# Patient Record
Sex: FEMALE | Employment: OTHER | URBAN - METROPOLITAN AREA
[De-identification: names, ages, dates, MRNs, and addresses within clinical notes are randomized per-mention and may not be internally consistent; named-entity substitution may affect disease eponyms.]

---

## 2024-04-10 ENCOUNTER — TRANSFERRED RECORDS (OUTPATIENT)
Dept: HEALTH INFORMATION MANAGEMENT | Facility: CLINIC | Age: 74
End: 2024-04-10
Payer: COMMERCIAL

## 2024-04-16 ENCOUNTER — OFFICE VISIT (OUTPATIENT)
Dept: OBGYN | Facility: CLINIC | Age: 74
End: 2024-04-16
Payer: COMMERCIAL

## 2024-04-16 VITALS
SYSTOLIC BLOOD PRESSURE: 119 MMHG | RESPIRATION RATE: 16 BRPM | HEIGHT: 60 IN | WEIGHT: 114.6 LBS | HEART RATE: 73 BPM | DIASTOLIC BLOOD PRESSURE: 71 MMHG | BODY MASS INDEX: 22.5 KG/M2 | TEMPERATURE: 97.6 F

## 2024-04-16 DIAGNOSIS — N83.291 COMPLEX CYST OF RIGHT OVARY: Primary | ICD-10-CM

## 2024-04-16 DIAGNOSIS — R27.0 ATAXIA, UNSPECIFIED: ICD-10-CM

## 2024-04-16 LAB
Lab: NORMAL
PERFORMING LABORATORY: NORMAL
TEST NAME: NORMAL

## 2024-04-16 PROCEDURE — 86304 IMMUNOASSAY TUMOR CA 125: CPT | Performed by: OBSTETRICS & GYNECOLOGY

## 2024-04-16 PROCEDURE — 99203 OFFICE O/P NEW LOW 30 MIN: CPT | Performed by: OBSTETRICS & GYNECOLOGY

## 2024-04-16 PROCEDURE — 36415 COLL VENOUS BLD VENIPUNCTURE: CPT | Performed by: OBSTETRICS & GYNECOLOGY

## 2024-04-16 RX ORDER — CARVEDILOL 12.5 MG/1
12.5 TABLET ORAL 2 TIMES DAILY WITH MEALS
COMMUNITY
Start: 2023-06-08

## 2024-04-16 RX ORDER — OLANZAPINE 10 MG/1
10 TABLET ORAL AT BEDTIME
COMMUNITY
Start: 2023-12-06

## 2024-04-16 RX ORDER — OLANZAPINE 5 MG/1
2.5 TABLET ORAL AT BEDTIME
COMMUNITY
Start: 2023-12-06

## 2024-04-16 RX ORDER — ATORVASTATIN CALCIUM 10 MG/1
10 TABLET, FILM COATED ORAL DAILY
COMMUNITY
Start: 2023-06-08

## 2024-04-16 RX ORDER — CLONAZEPAM 0.5 MG/1
1 TABLET ORAL AT BEDTIME
COMMUNITY
Start: 2024-02-27

## 2024-04-16 RX ORDER — CHLORTHALIDONE 25 MG/1
0.5 TABLET ORAL EVERY MORNING
COMMUNITY
Start: 2023-06-08

## 2024-04-16 RX ORDER — AMLODIPINE AND BENAZEPRIL HYDROCHLORIDE 10; 20 MG/1; MG/1
1 CAPSULE ORAL DAILY
COMMUNITY
Start: 2023-06-08

## 2024-04-16 RX ORDER — ALENDRONATE SODIUM 70 MG/1
70 TABLET ORAL
COMMUNITY
Start: 2023-08-17

## 2024-04-16 RX ORDER — POTASSIUM CHLORIDE 750 MG/1
10 CAPSULE, EXTENDED RELEASE ORAL DAILY
COMMUNITY
Start: 2023-06-08

## 2024-04-16 NOTE — PROGRESS NOTES
Chief Complaint   Patient presents with    Consult     Ovarian cyst.  Imaging done 4/10/24         HPI:  Tessie Graham, 74 year old,  presents with complaints of a cyst found on her ovary at CT scan and then ultrasound.  She had a colonoscopy followed by a CT scan where they found the cyst on her ovary.  She has had some pain since 2018 on her right side.  She had surgery for a bowel obstruction and her pain has persisted since then.  She has bloating and constipation, but no change in bowel habits.  No bleeding, change in appetite or weight.      Past Medical History:   Diagnosis Date    Acute bronchitis     Acute respiratory failure, unspecified whether with hypoxia or hypercapnia (H)     Anxiety     Bipolar disorder (H)     Delayed emergence from anesthesia     Gastric outlet obstruction     HTN (hypertension)     Hypopotassemia     Insomnia     Liver lesion     Lyme disease     Mixed hyperlipidemia     Peptic ulcer     Pulmonary embolism (H)     Tension headache     Toe fracture, right      Past Surgical History:   Procedure Laterality Date    APPENDECTOMY      BOWEL RESECTION      partial small bwoel    CHOLECYSTECTOMY      EGD      robotic hernia repair with mesh      umbilical    TUBAL LIGATION       Social History     Socioeconomic History    Marital status:      Spouse name: Not on file    Number of children: Not on file    Years of education: Not on file    Highest education level: Not on file   Occupational History    Not on file   Tobacco Use    Smoking status: Never    Smokeless tobacco: Never   Substance and Sexual Activity    Alcohol use: Not on file    Drug use: Not on file    Sexual activity: Not on file   Other Topics Concern    Not on file   Social History Narrative    Not on file     Social Determinants of Health     Financial Resource Strain: High Risk (2022)    Received from [employer] [facility]    Financial Resource Strain     Difficulty of Paying  Living Expenses: Not on file     Difficulty of Paying Living Expenses: Not on file   Food Insecurity: Not on file   Transportation Needs: Not on file   Physical Activity: Not on file   Stress: Not on file   Social Connections: Unknown (1/1/2022)    Received from [facility] & [facility]    Social Connections     Frequency of Communication with Friends and Family: Not on file   Interpersonal Safety: Not on file   Housing Stability: Not on file     History reviewed. No pertinent family history.     Current Outpatient Medications   Medication Sig Dispense Refill    alendronate (FOSAMAX) 70 MG tablet Take 70 mg by mouth every 7 days      amLODIPine-benazepril (LOTREL) 10-20 MG capsule Take 1 capsule by mouth daily      Aspirin 81 MG CAPS Take 81 mg by mouth daily      atorvastatin (LIPITOR) 10 MG tablet Take 10 mg by mouth daily      carvedilol (COREG) 12.5 MG tablet Take 12.5 mg by mouth 2 times daily (with meals)      chlorthalidone (HYGROTON) 25 MG tablet Take 0.5 tablets by mouth every morning      clonazePAM (KLONOPIN) 0.5 MG tablet Take 1 tablet by mouth at bedtime      OLANZapine (ZYPREXA) 10 MG tablet Take 10 mg by mouth at bedtime 12.5mg      OLANZapine (ZYPREXA) 5 MG tablet Take 2.5 mg by mouth at bedtime Take with 10 mg      omeprazole (PRILOSEC) 20 MG DR capsule Take 20 mg by mouth daily      potassium chloride ER (MICRO-K) 10 MEQ CR capsule Take 10 mEq by mouth daily          Allergies:     Allergies   Allergen Reactions    Azithromycin Other (See Comments) and Shortness Of Breath    Hydrochlorothiazide Itching    Trazodone Other (See Comments)    Triamterene Itching        ROS:  See HPI      Physical Exam:  /71 (BP Location: Right arm, Patient Position: Sitting, Cuff Size: Adult Regular)   Pulse 73   Temp 97.6  F (36.4  C)   Resp 16   Ht 1.524 m (5')   Wt 52 kg (114 lb 9.6 oz)   BMI 22.38 kg/m       Appearance: well developed, well nourished, no acute distress  Head Exam  normocephalic, no lesions or deformities  Abdomen: soft, Mild RLQ tenderness, no masses, no organomegaly, no hernia, no rebound or guarding.  Vertical umbilical scar consistent with surgery.  Skin: no lesions  Extremities: no edema  Psych: orientated x3      Impression    1.  Normal sonographic appearance of the uterus.  2.  The right ovary contains 2 mildly complex cysts one within the apparent rim calcified wall, measuring  2.8 x 3.2 x 3.9 cm and the other with uniform low-level internal echoes, measuring 1.3 x 1.5 x 2.1 cm. Per the attached reference consider surgical consultation.      COMPLEX INDETERMINATE CYSTS:  Postmenopausal:  Any complex cyst: Surgical consultation.    REFERENCE:  Management of Asymptomatic Ovarian and Other Adnexal Cysts Imaged at US: Society of Radiologists in Ultrasound Consensus Conference Statement. Radiology September 2010; [id number].    Simple Adnexal Cysts: U Consensus Conference Update on Follow-up and Reporting. Radiology September 2019. [id number].  Narrative    For Patients: As a result of the 21st Century Cures Act, medical imaging exams and procedure reports are released immediately into your electronic medical record. You may view this report before your referring provider. If you have questions, please contact your health care provider.    EXAM: US PELVIS COMPLETE TA AND TV WITH DUPLEX  LOCATION: [facility]  DATE: 4/10/2024    INDICATION: Adnexal mass.  COMPARISON: None.  TECHNIQUE: Transabdominal scans were performed. Endovaginal ultrasound was performed to better visualize the adnexa. Color flow with spectral Doppler and waveform analysis performed.    FINDINGS:    UTERUS: 6 x 2 x 3.3 cm. Normal in size and position with no masses.    ENDOMETRIUM: 2 mm. Normal smooth endometrium.    RIGHT OVARY: 4.9 x 3.3 x 4.2 cm. There is a cyst with an echogenic rim suggesting a possible calcified rim measuring 2.8 x 3.2 x 3.9 cm  . There is a rounded  hypoechoic area with low-level internal echoes measuring 1.3 x 1.5 x 2.1 cm. Arterial and venous duplex flow identified within the right ovary.    LEFT OVARY: Obscured by bowel gas and not visualized. Normal with arterial and venous duplex flow identified.    No significant free fluid.     Assessment/Plan:  Encounter Diagnoses   Name Primary?    Complex cyst of right ovary Yes           Discussed cysts on the ovaries.  Discussed options including observation or surgical evaluation. Discussed blood tests and DAT score ordered today. Due to the complex nature of the cyst, age and surgical risk factors, I did recommend consult with gyn-oncology.      She has a history of a partial small bowel resection, umbilical hernia with mesh as well as hypotension with anesthesia and a history of PE.      30 minutes spent on 4/16/2024 doing chart review, review of outside records, review of test results, patient visit, and documentation.      Thao Lopes MD on 4/16/2024 at 10:24 AM

## 2024-04-18 LAB — MISCELLANEOUS TEST 1 (ARUP): ABNORMAL

## 2024-05-03 ENCOUNTER — TRANSFERRED RECORDS (OUTPATIENT)
Dept: HEALTH INFORMATION MANAGEMENT | Facility: CLINIC | Age: 74
End: 2024-05-03
Payer: COMMERCIAL

## 2024-05-07 NOTE — CONSULTS
GYNECOLOGIC ONCOLOGY CONSULT    Patient Name: SAM GRAHAM Patient : 1950  Patient MRN: [id number]  Referring Physician: Thao Vazquez MD  Primary GYNOncologist: Brittni Jackson (Gynecological/Oncology)  Date of Service: 2024    Reason for Consult:  Treatment recommendations    History of Present Illness (Gyn Oncology):  Sam Graham is a 74-year-old female who was recently identified to have 2 mildly complex right ovarian cysts one within the apparent rim calcified wall, measuring 2.8 x 3.2 x 3.9 cm and the other with uniform low-level internal echoes, measuring 1.3 x 1.5 x 2.1 cm per pelvic ultrasound 4/10/24. She presents today for treatment recommendations.     Clinical Course:  18-/18: Sam presented with stabby, right lower quadrant abdominal pain for 2 days with associated nausea, rated at a 10/10. She has had an episode of emesis. She has chronic constipation. She was also feeling very hot.  18: CT AP showed high-grade small bowel obstruction with apparent transition point in the right hemiabdomen. There is associated perihepatic and central mesenteric ascites as well as inflammatory stranding in the central mesentery. Surgical consultation warranted. No evidence of pneumatosis and no evidence of intra-abdominal abscess or visceral perforation. Diverticulosis. Multicystic adnexal lesion, which warrants further evaluation with pelvic ultrasound.  She underwent XL, small bowel resection. Pathology was negative for perforation or obvious bacterial proliferation.  18: CT CAP showed bilateral pleural effusions with atelectatic lung consolidations. Groundglass lung opacities, suspect pulmonary edema, fluid overload. Postoperative fluid in the abdomen with a small amount of right flank subcutaneous fluid. Endotracheal tube and NG tube in good position. There has been relief of bowel obstruction with no current evidence of viscus rupture, postoperative free fluid.  18: CT  AP showed recurrent bowel obstruction with transition point just distal to the resection line of the small bowel. Bibasilar lung atelectasis, consolidations and fluid. Pelvic cyst with free fluid in the abdomen.  9/12/18: CT CAP showed no evidence of bowel obstruction. Basilar lung pleural effusions and consolidative atelectasis persists. Abdominal free fluid stable. No evidence of bowel wall edema or hypervascularity. Vaginal and bladder gas again noted. Right adnexal cystic changes stable. NG tube in good position with decompressed bowel.  9/14/18: Bone marrow biopsy moderate, hypochromic, normocytic anemia. Moderate thrombocytosis.  9/17/18: EGD was normal. Demonstrated no source of chronic or acute GI blood loss.  2/28/24: She presented with right lower quadrant abdominal pain. She has been getting on and off right lower quadrant abdominal pain. She does have a history of a small bowel obstruction and had a partial resection 9/4/18. She has had pain on and off since that time. She is due for a colonoscopy and has 1 scheduled for 3/13/24. She recently had an exacerbation from Sunday to Friday a week ago but it spontaneously resolved on its own. She is not currently having pain. She had pain in her right lower quadrant that radiated around to her back.  3/29/24: CT AP showed no signs of bowel obstruction or significant bowel inflammation. Possible retained pill endoscopy device within the proximal ascending colon. Please correlate with history. A single 3 mm stone in the left kidney. No other urolithiasis. No ureteral stones or ureteral dilatation. Slight prominence of the right renal pelvis is nonspecific but could be related to chronic mild congenital ureteropelvic junction obstruction. Complex cystic mass in the right adnexa. Recommend further evaluation with pelvic ultrasound.  4/10/24: Pelvic ultrasound showed normal sonographic appearance of the uterus. The right ovary contains 2 mildly complex cysts one  within the apparent rim calcified wall, measuring 2.8 x 3.2 x 3.9 cm and the other with uniform low-level internal echoes, measuring 1.3 x 1.5 x 2.1 cm.  4/16/24:  = 19.  Genetic Testing (Gyn Oncology):  N/A    Interval History (Gyn Oncology):  Tessie presents to the clinic today for further consultation and treatment recommendations. She reports she is doing okay today. The patient complains of intermittent right lower quadrant abdominal pain, lower back pain, and persistent constipation. She has been eating and drinking okay. She denies any abnormal vaginal bleeding or pelvic pain. She denies dysuria, hematuria, melena, or bright red blood per rectum. She has no abnormal lower extremity edema. She denies peripheral neuropathy. She denies any nausea, vomiting, fever, shortness of breath, chest pain, cough, night sweats, and chills.    Review of Systems:  A complete 14-point review of systems is negative except as noted in the above history of present illness.    Past Medical History:  Right lower quadrant abdominal pain  Nausea  Chronic constipation  Bipolar 1 disorder  Disorders of liver  Pulmonary embolism and infarction  Chest pain  GERD  Hypopotassemia  Hypertension  Anxiety  Blood in stool  Displacement of intervertebral disc  Pure hypercholesterolemia  Tension headache   Gastric outlet obstruction  Hyperlipidemia  Insomnia  Liver lesion  Lyme disease  Peptic ulcer  Right 5th toe fracture  Recurrent small bowel obstruction  Diverticulosis  Right adnexal lesion/cyst  Emesis  Bilateral pleural effusions  Pelvic cyst  Normocytic anemia  Thrombocytosis  Pneumonia  Hypotension  Osteoporosis  Right ear decreased hearing due to cerumen impaction  Left kidney stone  Bronchitis    Surgical History:  Cholecystectomy 2003  Appendectomy 2000  Colonoscopy [facility] EGD 2010  Tubal ligation 1986  XL, small bowel resection 9/4/18  EGD 9/17/18  Colonoscopy with polypectomy 3/13/24  Robotic umbilical hernia repair with  mesh    OB/Gyn History:  Menarche age: 14  . , x2.   LMP: 57  Last mammogram:   Last Pap smear: Unknown   Last colonoscopy 3/13/24, normal  Denies history of HRT    Allergies#  azithromycin, hydrochlorothiazide, trazodone and triamterene    Medications:  Fosamax (Alendronate Oral (Weekly)) 70 mg tablet  Lipitor (Atorvastatin Oral) 10 mg tablet  Klonopin (Clonazepam Oral) 0.5 mg tablet  Prilosec (Omeprazole Oral Delayed Release Tablet) 20 mg tablet,delayed release (DR/EC)  Zyprexa (Olanzapine Oral) 10 mg tablet  Amlodipine Oral 10 mg tablet  Potassium Chloride Oral ER Cap 10 mEq capsule, extended release  Aspirin Oral 81 mg tablet,chewable  Chlorthalidone Oral 25 mg tablet  Coreg (Carvedilol Oral) 12.5 mg tablet  Family History:  Mother - Cervical cancer  Maternal grandfather: Hodgkin's lymphoma.    Maternal grandmother: Cancer, unknown type.     Social History:    Not sexually active  Lives in a house  Never smoker  No alcohol consumption  No illicit drug use    Health Maintenance:    Vital Signs:  Blood pressure: 134/70, Pulse: 62, Temperature: 97.2 F, Respirations: 16, O2 sat: 96%, Pain Scale: 0, Height: 60 in, Weight: 114.2 lb, BSA: 1.47, BMI: 22.3 kg/m2    Physical Exam (Gyn Oncology):  General: Alert and oriented x3, no acute distress. Thin and elderly  HEENT: Normocephalic, atraumatic.  Lymph node exam: No supraclavicular, submandibular, or cervical lymphadenopathy.  CV: Regular rate and rhythm, no murmurs, rubs or gallops.  Respiratory: Clear to auscultation bilaterally, no wheezes, rales, or rhonchi.  Abdomen: Soft, non-tender to palpation, no rebound or guarding.  Lower Extremity Exam: No lower extremity edema, no palpable cords.  Neuro: Cranial nerves II-XII intact, gross motor skills intact.  Genitourinary exam: Deferred.    Laboratory Data:    Imagin18: CT AP  IMPRESSION:  High-grade small bowel obstruction with apparent transition point in the right hemiabdomen. There is  associated perihepatic and central mesenteric ascites as well as inflammatory stranding in the central mesentery. Surgical consultation warranted. No evidence of pneumatosis and no evidence of intra-abdominal abscess or visceral perforation. Diverticulosis. Multicystic adnexal lesion, which warrants further evaluation with pelvic ultrasound.    9/5/18: CT CAP  IMPRESSION:  Bilateral pleural effusions with atelectatic lung consolidations. Groundglass lung opacities, suspect pulmonary edema, fluid overload. Postoperative fluid in the abdomen with a small amount of right flank subcutaneous fluid. Endotracheal tube and NG tube in good position. There has been relief of bowel obstruction with no current evidence of viscus rupture, postoperative free fluid.    9/9/18: CT AP  IMPRESSION:  Recurrent bowel obstruction with transition point just distal to the resection line of the small bowel. Bibasilar lung atelectasis, consolidations and fluid. Pelvic cyst with free fluid in the abdomen.    9/12/18: CT CAP  IMPRESSION:  No evidence of bowel obstruction. Basilar lung pleural effusions and consolidative atelectasis persists. Abdominal free fluid stable. No evidence of bowel wall edema or hypervascularity. Vaginal and bladder gas again noted. Right adnexal cystic changes stable. NG tube in good position with decompressed bowel.    3/29/24: CT AP  IMPRESSION:  No signs of bowel obstruction or significant bowel inflammation. Possible retained pill endoscopy device within the proximal ascending colon. Please correlate with history. A single 3 mm stone in the left kidney. No other urolithiasis. No ureteral stones or ureteral dilatation. Slight prominence of the right renal pelvis is nonspecific but could be related to chronic mild congenital ureteropelvic junction obstruction. Complex cystic mass in the right adnexa. Recommend further evaluation with pelvic ultrasound.    4/10/24: US Pelvis  IMPRESSION:  Normal sonographic  appearance of the uterus. The right ovary contains 2 mildly complex cysts one within the apparent rim calcified wall, measuring 2.8 x 3.2 x 3.9 cm and the other with uniform low-level internal echoes, measuring 1.3 x 1.5 x 2.1 cm.    Problems:       Assessment & Plan (Gyn Oncology):  Tessie Graham is a 74-year-old female who was recently identified to have 2 mildly complex right ovarian cysts one within the apparent rim calcified wall, measuring 2.8 x 3.2 x 3.9 cm and the other with uniform low-level internal echoes, measuring 1.3 x 1.5 x 2.1 cm per pelvic ultrasound 4/10/24. She presents today for treatment recommendations.   Right complex ovarian cystic lesions - Tessie, her , and I reviewed her imaging in detail together. We reviewed the mildly complex lesions appearing to arise from the right ovary and fallopian tube. We did review that a slightly larger version of these lesions was present back in 2018 when Tessie was undergoing workup for her small bowel obstruction. At that time, there was a lot more inflammation and haziness associated with bowel obstruction within the abdomen and pelvis. At this time, there is now a more clearly well-defined persistent cystic ovarian lesion of the right ovary. We reviewed options for treatment include either observation with repeat imaging in 8-12 weeks from the prior to evaluate for a change versus proceeding with surgical management. We reviewed the surgical management would include a robotic-assisted bilateral salpingo-oophorectomy with possible staging. We reviewed the staging could include removal of the uterus, the omentum, and lymph nodes along the aorta and in the pelvis. We reviewed the risks of surgery that include bleeding, infection, and potential damage to the surrounding structures. We reviewed the general perioperative expectations. We also reviewed the need for preoperative history and physical with her primary care provider. Being that Tessie has  associated low back discomfort and abdominal pain in an intermittent fashion. She is somewhat determined or certain that her symptoms are related to her right ovary. We reviewed that surgery may not completely treat her symptoms; however, it is not unreasonable to proceed with a bilateral salpingo-oophorectomy in an effort to help her feel better on a general basis. Tessie and her  desire to proceed with surgery and were in agreement with the plan. We will get her procedure scheduled as soon as reasonable.   Thank you very much for allowing me to take part in the care of this very sweet patient.    3 minutes in reviewing records, 18 minutes in discussion, 2 minutes ordering labs.    Pain Care Management:  Pain Scale: 0    Patient Care needs:  Depressions Status: Was not screened Reason: Bipolar disorder; Screening Date: 05/01/2024  Smoking Status: Smoking Tobacco : Never smoker; Smokeless Tobacco : none found; Vaping : none found  Documentation assistance provided by Sheikh Jenae Bradford, scribing for Dr. Brittni Jackson, on 5/3/2024.  The patient and family/friends if present were apprised of the use of a scribe through remote viewing and all parties consented to conducting the visit in this manner.    Brittni Jackson MD  Phone: [phone]  Fax: [phone]

## 2024-05-07 NOTE — H&P (VIEW-ONLY)
GYNECOLOGIC ONCOLOGY CONSULT    Patient Name: SAM GRAHAM Patient : 1950  Patient MRN: [id number]  Referring Physician: Thao Vazquez MD  Primary GYNOncologist: Brittni Jackson (Gynecological/Oncology)  Date of Service: 2024    Reason for Consult:  Treatment recommendations    History of Present Illness (Gyn Oncology):  Sam Graham is a 74-year-old female who was recently identified to have 2 mildly complex right ovarian cysts one within the apparent rim calcified wall, measuring 2.8 x 3.2 x 3.9 cm and the other with uniform low-level internal echoes, measuring 1.3 x 1.5 x 2.1 cm per pelvic ultrasound 4/10/24. She presents today for treatment recommendations.     Clinical Course:  18-/18: Sam presented with stabby, right lower quadrant abdominal pain for 2 days with associated nausea, rated at a 10/10. She has had an episode of emesis. She has chronic constipation. She was also feeling very hot.  18: CT AP showed high-grade small bowel obstruction with apparent transition point in the right hemiabdomen. There is associated perihepatic and central mesenteric ascites as well as inflammatory stranding in the central mesentery. Surgical consultation warranted. No evidence of pneumatosis and no evidence of intra-abdominal abscess or visceral perforation. Diverticulosis. Multicystic adnexal lesion, which warrants further evaluation with pelvic ultrasound.  She underwent XL, small bowel resection. Pathology was negative for perforation or obvious bacterial proliferation.  18: CT CAP showed bilateral pleural effusions with atelectatic lung consolidations. Groundglass lung opacities, suspect pulmonary edema, fluid overload. Postoperative fluid in the abdomen with a small amount of right flank subcutaneous fluid. Endotracheal tube and NG tube in good position. There has been relief of bowel obstruction with no current evidence of viscus rupture, postoperative free fluid.  18: CT  AP showed recurrent bowel obstruction with transition point just distal to the resection line of the small bowel. Bibasilar lung atelectasis, consolidations and fluid. Pelvic cyst with free fluid in the abdomen.  9/12/18: CT CAP showed no evidence of bowel obstruction. Basilar lung pleural effusions and consolidative atelectasis persists. Abdominal free fluid stable. No evidence of bowel wall edema or hypervascularity. Vaginal and bladder gas again noted. Right adnexal cystic changes stable. NG tube in good position with decompressed bowel.  9/14/18: Bone marrow biopsy moderate, hypochromic, normocytic anemia. Moderate thrombocytosis.  9/17/18: EGD was normal. Demonstrated no source of chronic or acute GI blood loss.  2/28/24: She presented with right lower quadrant abdominal pain. She has been getting on and off right lower quadrant abdominal pain. She does have a history of a small bowel obstruction and had a partial resection 9/4/18. She has had pain on and off since that time. She is due for a colonoscopy and has 1 scheduled for 3/13/24. She recently had an exacerbation from Sunday to Friday a week ago but it spontaneously resolved on its own. She is not currently having pain. She had pain in her right lower quadrant that radiated around to her back.  3/29/24: CT AP showed no signs of bowel obstruction or significant bowel inflammation. Possible retained pill endoscopy device within the proximal ascending colon. Please correlate with history. A single 3 mm stone in the left kidney. No other urolithiasis. No ureteral stones or ureteral dilatation. Slight prominence of the right renal pelvis is nonspecific but could be related to chronic mild congenital ureteropelvic junction obstruction. Complex cystic mass in the right adnexa. Recommend further evaluation with pelvic ultrasound.  4/10/24: Pelvic ultrasound showed normal sonographic appearance of the uterus. The right ovary contains 2 mildly complex cysts one  within the apparent rim calcified wall, measuring 2.8 x 3.2 x 3.9 cm and the other with uniform low-level internal echoes, measuring 1.3 x 1.5 x 2.1 cm.  4/16/24:  = 19.  Genetic Testing (Gyn Oncology):  N/A    Interval History (Gyn Oncology):  Tessie presents to the clinic today for further consultation and treatment recommendations. She reports she is doing okay today. The patient complains of intermittent right lower quadrant abdominal pain, lower back pain, and persistent constipation. She has been eating and drinking okay. She denies any abnormal vaginal bleeding or pelvic pain. She denies dysuria, hematuria, melena, or bright red blood per rectum. She has no abnormal lower extremity edema. She denies peripheral neuropathy. She denies any nausea, vomiting, fever, shortness of breath, chest pain, cough, night sweats, and chills.    Review of Systems:  A complete 14-point review of systems is negative except as noted in the above history of present illness.    Past Medical History:  Right lower quadrant abdominal pain  Nausea  Chronic constipation  Bipolar 1 disorder  Disorders of liver  Pulmonary embolism and infarction  Chest pain  GERD  Hypopotassemia  Hypertension  Anxiety  Blood in stool  Displacement of intervertebral disc  Pure hypercholesterolemia  Tension headache   Gastric outlet obstruction  Hyperlipidemia  Insomnia  Liver lesion  Lyme disease  Peptic ulcer  Right 5th toe fracture  Recurrent small bowel obstruction  Diverticulosis  Right adnexal lesion/cyst  Emesis  Bilateral pleural effusions  Pelvic cyst  Normocytic anemia  Thrombocytosis  Pneumonia  Hypotension  Osteoporosis  Right ear decreased hearing due to cerumen impaction  Left kidney stone  Bronchitis    Surgical History:  Cholecystectomy 2003  Appendectomy 2000  Colonoscopy [facility] EGD 2010  Tubal ligation 1986  XL, small bowel resection 9/4/18  EGD 9/17/18  Colonoscopy with polypectomy 3/13/24  Robotic umbilical hernia repair with  mesh    OB/Gyn History:  Menarche age: 14  . , x2.   LMP: 57  Last mammogram:   Last Pap smear: Unknown   Last colonoscopy 3/13/24, normal  Denies history of HRT    Allergies#  azithromycin, hydrochlorothiazide, trazodone and triamterene    Medications:  Fosamax (Alendronate Oral (Weekly)) 70 mg tablet  Lipitor (Atorvastatin Oral) 10 mg tablet  Klonopin (Clonazepam Oral) 0.5 mg tablet  Prilosec (Omeprazole Oral Delayed Release Tablet) 20 mg tablet,delayed release (DR/EC)  Zyprexa (Olanzapine Oral) 10 mg tablet  Amlodipine Oral 10 mg tablet  Potassium Chloride Oral ER Cap 10 mEq capsule, extended release  Aspirin Oral 81 mg tablet,chewable  Chlorthalidone Oral 25 mg tablet  Coreg (Carvedilol Oral) 12.5 mg tablet  Family History:  Mother - Cervical cancer  Maternal grandfather: Hodgkin's lymphoma.    Maternal grandmother: Cancer, unknown type.     Social History:    Not sexually active  Lives in a house  Never smoker  No alcohol consumption  No illicit drug use    Health Maintenance:    Vital Signs:  Blood pressure: 134/70, Pulse: 62, Temperature: 97.2 F, Respirations: 16, O2 sat: 96%, Pain Scale: 0, Height: 60 in, Weight: 114.2 lb, BSA: 1.47, BMI: 22.3 kg/m2    Physical Exam (Gyn Oncology):  General: Alert and oriented x3, no acute distress. Thin and elderly  HEENT: Normocephalic, atraumatic.  Lymph node exam: No supraclavicular, submandibular, or cervical lymphadenopathy.  CV: Regular rate and rhythm, no murmurs, rubs or gallops.  Respiratory: Clear to auscultation bilaterally, no wheezes, rales, or rhonchi.  Abdomen: Soft, non-tender to palpation, no rebound or guarding.  Lower Extremity Exam: No lower extremity edema, no palpable cords.  Neuro: Cranial nerves II-XII intact, gross motor skills intact.  Genitourinary exam: Deferred.    Laboratory Data:    Imagin18: CT AP  IMPRESSION:  High-grade small bowel obstruction with apparent transition point in the right hemiabdomen. There is  associated perihepatic and central mesenteric ascites as well as inflammatory stranding in the central mesentery. Surgical consultation warranted. No evidence of pneumatosis and no evidence of intra-abdominal abscess or visceral perforation. Diverticulosis. Multicystic adnexal lesion, which warrants further evaluation with pelvic ultrasound.    9/5/18: CT CAP  IMPRESSION:  Bilateral pleural effusions with atelectatic lung consolidations. Groundglass lung opacities, suspect pulmonary edema, fluid overload. Postoperative fluid in the abdomen with a small amount of right flank subcutaneous fluid. Endotracheal tube and NG tube in good position. There has been relief of bowel obstruction with no current evidence of viscus rupture, postoperative free fluid.    9/9/18: CT AP  IMPRESSION:  Recurrent bowel obstruction with transition point just distal to the resection line of the small bowel. Bibasilar lung atelectasis, consolidations and fluid. Pelvic cyst with free fluid in the abdomen.    9/12/18: CT CAP  IMPRESSION:  No evidence of bowel obstruction. Basilar lung pleural effusions and consolidative atelectasis persists. Abdominal free fluid stable. No evidence of bowel wall edema or hypervascularity. Vaginal and bladder gas again noted. Right adnexal cystic changes stable. NG tube in good position with decompressed bowel.    3/29/24: CT AP  IMPRESSION:  No signs of bowel obstruction or significant bowel inflammation. Possible retained pill endoscopy device within the proximal ascending colon. Please correlate with history. A single 3 mm stone in the left kidney. No other urolithiasis. No ureteral stones or ureteral dilatation. Slight prominence of the right renal pelvis is nonspecific but could be related to chronic mild congenital ureteropelvic junction obstruction. Complex cystic mass in the right adnexa. Recommend further evaluation with pelvic ultrasound.    4/10/24: US Pelvis  IMPRESSION:  Normal sonographic  appearance of the uterus. The right ovary contains 2 mildly complex cysts one within the apparent rim calcified wall, measuring 2.8 x 3.2 x 3.9 cm and the other with uniform low-level internal echoes, measuring 1.3 x 1.5 x 2.1 cm.    Problems:       Assessment & Plan (Gyn Oncology):  Tessie Graham is a 74-year-old female who was recently identified to have 2 mildly complex right ovarian cysts one within the apparent rim calcified wall, measuring 2.8 x 3.2 x 3.9 cm and the other with uniform low-level internal echoes, measuring 1.3 x 1.5 x 2.1 cm per pelvic ultrasound 4/10/24. She presents today for treatment recommendations.   Right complex ovarian cystic lesions - Tessie, her , and I reviewed her imaging in detail together. We reviewed the mildly complex lesions appearing to arise from the right ovary and fallopian tube. We did review that a slightly larger version of these lesions was present back in 2018 when Tessie was undergoing workup for her small bowel obstruction. At that time, there was a lot more inflammation and haziness associated with bowel obstruction within the abdomen and pelvis. At this time, there is now a more clearly well-defined persistent cystic ovarian lesion of the right ovary. We reviewed options for treatment include either observation with repeat imaging in 8-12 weeks from the prior to evaluate for a change versus proceeding with surgical management. We reviewed the surgical management would include a robotic-assisted bilateral salpingo-oophorectomy with possible staging. We reviewed the staging could include removal of the uterus, the omentum, and lymph nodes along the aorta and in the pelvis. We reviewed the risks of surgery that include bleeding, infection, and potential damage to the surrounding structures. We reviewed the general perioperative expectations. We also reviewed the need for preoperative history and physical with her primary care provider. Being that Tessie has  associated low back discomfort and abdominal pain in an intermittent fashion. She is somewhat determined or certain that her symptoms are related to her right ovary. We reviewed that surgery may not completely treat her symptoms; however, it is not unreasonable to proceed with a bilateral salpingo-oophorectomy in an effort to help her feel better on a general basis. Tessie and her  desire to proceed with surgery and were in agreement with the plan. We will get her procedure scheduled as soon as reasonable.   Thank you very much for allowing me to take part in the care of this very sweet patient.    3 minutes in reviewing records, 18 minutes in discussion, 2 minutes ordering labs.    Pain Care Management:  Pain Scale: 0    Patient Care needs:  Depressions Status: Was not screened Reason: Bipolar disorder; Screening Date: 05/01/2024  Smoking Status: Smoking Tobacco : Never smoker; Smokeless Tobacco : none found; Vaping : none found  Documentation assistance provided by Sheikh Jenae Bradford, scribing for Dr. Brittni Jackson, on 5/3/2024.  The patient and family/friends if present were apprised of the use of a scribe through remote viewing and all parties consented to conducting the visit in this manner.    Brittni Jackson MD  Phone: [phone]  Fax: [phone]

## 2024-05-20 RX ORDER — POLYETHYLENE GLYCOL 3350 17 G/17G
1 POWDER, FOR SOLUTION ORAL DAILY
COMMUNITY

## 2024-05-20 RX ORDER — ACETAMINOPHEN 325 MG/1
650 TABLET ORAL EVERY 6 HOURS PRN
COMMUNITY

## 2024-05-20 RX ORDER — TRIAMCINOLONE ACETONIDE 0.25 MG/G
CREAM TOPICAL 2 TIMES DAILY
COMMUNITY

## 2024-05-20 RX ORDER — FAMOTIDINE 20 MG
TABLET ORAL
COMMUNITY

## 2024-05-21 ENCOUNTER — HOSPITAL ENCOUNTER (OUTPATIENT)
Facility: HOSPITAL | Age: 74
Discharge: HOME | End: 2024-05-21
Attending: OBSTETRICS & GYNECOLOGY | Admitting: OBSTETRICS & GYNECOLOGY
Payer: MEDICARE

## 2024-05-21 ENCOUNTER — ANESTHESIA EVENT (OUTPATIENT)
Dept: SURGERY | Facility: HOSPITAL | Age: 74
End: 2024-05-21
Payer: MEDICARE

## 2024-05-21 ENCOUNTER — ANESTHESIA (OUTPATIENT)
Dept: SURGERY | Facility: HOSPITAL | Age: 74
End: 2024-05-21
Payer: MEDICARE

## 2024-05-21 VITALS
WEIGHT: 113 LBS | SYSTOLIC BLOOD PRESSURE: 135 MMHG | DIASTOLIC BLOOD PRESSURE: 69 MMHG | RESPIRATION RATE: 23 BRPM | HEART RATE: 60 BPM | BODY MASS INDEX: 22.07 KG/M2 | TEMPERATURE: 97.3 F | OXYGEN SATURATION: 93 %

## 2024-05-21 DIAGNOSIS — G89.18 POSTOPERATIVE PAIN: Primary | ICD-10-CM

## 2024-05-21 PROCEDURE — 258N000003 HC RX IP 258 OP 636: Performed by: ANESTHESIOLOGY

## 2024-05-21 PROCEDURE — 250N000013 HC RX MED GY IP 250 OP 250 PS 637: Performed by: ANESTHESIOLOGY

## 2024-05-21 PROCEDURE — 370N000017 HC ANESTHESIA TECHNICAL FEE, PER MIN: Performed by: OBSTETRICS & GYNECOLOGY

## 2024-05-21 PROCEDURE — 250N000009 HC RX 250: Performed by: OBSTETRICS & GYNECOLOGY

## 2024-05-21 PROCEDURE — 999N000141 HC STATISTIC PRE-PROCEDURE NURSING ASSESSMENT: Performed by: OBSTETRICS & GYNECOLOGY

## 2024-05-21 PROCEDURE — 250N000011 HC RX IP 250 OP 636: Performed by: ANESTHESIOLOGY

## 2024-05-21 PROCEDURE — 88311 DECALCIFY TISSUE: CPT | Mod: TC | Performed by: OBSTETRICS & GYNECOLOGY

## 2024-05-21 PROCEDURE — 258N000003 HC RX IP 258 OP 636: Performed by: OBSTETRICS & GYNECOLOGY

## 2024-05-21 PROCEDURE — 88331 PATH CONSLTJ SURG 1 BLK 1SPC: CPT | Mod: TC | Performed by: OBSTETRICS & GYNECOLOGY

## 2024-05-21 PROCEDURE — 272N000001 HC OR GENERAL SUPPLY STERILE: Performed by: OBSTETRICS & GYNECOLOGY

## 2024-05-21 PROCEDURE — 250N000009 HC RX 250: Performed by: ANESTHESIOLOGY

## 2024-05-21 PROCEDURE — 250N000011 HC RX IP 250 OP 636: Performed by: OBSTETRICS & GYNECOLOGY

## 2024-05-21 PROCEDURE — 250N000011 HC RX IP 250 OP 636: Performed by: STUDENT IN AN ORGANIZED HEALTH CARE EDUCATION/TRAINING PROGRAM

## 2024-05-21 PROCEDURE — 710N000009 HC RECOVERY PHASE 1, LEVEL 1, PER MIN: Performed by: OBSTETRICS & GYNECOLOGY

## 2024-05-21 PROCEDURE — 250N000025 HC SEVOFLURANE, PER MIN: Performed by: OBSTETRICS & GYNECOLOGY

## 2024-05-21 PROCEDURE — 710N000012 HC RECOVERY PHASE 2, PER MINUTE: Performed by: OBSTETRICS & GYNECOLOGY

## 2024-05-21 PROCEDURE — 360N000080 HC SURGERY LEVEL 7, PER MIN: Performed by: OBSTETRICS & GYNECOLOGY

## 2024-05-21 RX ORDER — ONDANSETRON 2 MG/ML
INJECTION INTRAMUSCULAR; INTRAVENOUS PRN
Status: DISCONTINUED | OUTPATIENT
Start: 2024-05-21 | End: 2024-05-21

## 2024-05-21 RX ORDER — TRAMADOL HYDROCHLORIDE 50 MG/1
50 TABLET ORAL EVERY 6 HOURS PRN
Qty: 12 TABLET | Refills: 0 | Status: SHIPPED | OUTPATIENT
Start: 2024-05-21 | End: 2024-05-24

## 2024-05-21 RX ORDER — DEXAMETHASONE SODIUM PHOSPHATE 10 MG/ML
4 INJECTION, SOLUTION INTRAMUSCULAR; INTRAVENOUS
Status: DISCONTINUED | OUTPATIENT
Start: 2024-05-21 | End: 2024-05-21 | Stop reason: HOSPADM

## 2024-05-21 RX ORDER — OXYCODONE HYDROCHLORIDE 5 MG/1
5 TABLET ORAL
Status: DISCONTINUED | OUTPATIENT
Start: 2024-05-21 | End: 2024-05-21 | Stop reason: HOSPADM

## 2024-05-21 RX ORDER — AMOXICILLIN 250 MG
1-2 CAPSULE ORAL 2 TIMES DAILY PRN
Qty: 30 TABLET | Refills: 0 | Status: SHIPPED | OUTPATIENT
Start: 2024-05-21

## 2024-05-21 RX ORDER — FENTANYL CITRATE 50 UG/ML
INJECTION, SOLUTION INTRAMUSCULAR; INTRAVENOUS PRN
Status: DISCONTINUED | OUTPATIENT
Start: 2024-05-21 | End: 2024-05-21

## 2024-05-21 RX ORDER — DEXAMETHASONE SODIUM PHOSPHATE 10 MG/ML
INJECTION, SOLUTION INTRAMUSCULAR; INTRAVENOUS PRN
Status: DISCONTINUED | OUTPATIENT
Start: 2024-05-21 | End: 2024-05-21

## 2024-05-21 RX ORDER — OXYCODONE HYDROCHLORIDE 5 MG/1
5 TABLET ORAL
Status: COMPLETED | OUTPATIENT
Start: 2024-05-21 | End: 2024-05-21

## 2024-05-21 RX ORDER — OXYCODONE HYDROCHLORIDE 5 MG/1
10 TABLET ORAL
Status: DISCONTINUED | OUTPATIENT
Start: 2024-05-21 | End: 2024-05-21 | Stop reason: HOSPADM

## 2024-05-21 RX ORDER — SODIUM CHLORIDE, SODIUM LACTATE, POTASSIUM CHLORIDE, CALCIUM CHLORIDE 600; 310; 30; 20 MG/100ML; MG/100ML; MG/100ML; MG/100ML
INJECTION, SOLUTION INTRAVENOUS CONTINUOUS
Status: DISCONTINUED | OUTPATIENT
Start: 2024-05-21 | End: 2024-05-21 | Stop reason: HOSPADM

## 2024-05-21 RX ORDER — FENTANYL CITRATE 50 UG/ML
25 INJECTION, SOLUTION INTRAMUSCULAR; INTRAVENOUS EVERY 5 MIN PRN
Status: DISCONTINUED | OUTPATIENT
Start: 2024-05-21 | End: 2024-05-21 | Stop reason: HOSPADM

## 2024-05-21 RX ORDER — PROPOFOL 10 MG/ML
INJECTION, EMULSION INTRAVENOUS CONTINUOUS PRN
Status: DISCONTINUED | OUTPATIENT
Start: 2024-05-21 | End: 2024-05-21

## 2024-05-21 RX ORDER — SODIUM CHLORIDE, SODIUM LACTATE, POTASSIUM CHLORIDE, AND CALCIUM CHLORIDE .6; .31; .03; .02 G/100ML; G/100ML; G/100ML; G/100ML
IRRIGANT IRRIGATION PRN
Status: DISCONTINUED | OUTPATIENT
Start: 2024-05-21 | End: 2024-05-21 | Stop reason: HOSPADM

## 2024-05-21 RX ORDER — ONDANSETRON 2 MG/ML
4 INJECTION INTRAMUSCULAR; INTRAVENOUS EVERY 30 MIN PRN
Status: DISCONTINUED | OUTPATIENT
Start: 2024-05-21 | End: 2024-05-21 | Stop reason: HOSPADM

## 2024-05-21 RX ORDER — ONDANSETRON 4 MG/1
4 TABLET, ORALLY DISINTEGRATING ORAL EVERY 30 MIN PRN
Status: DISCONTINUED | OUTPATIENT
Start: 2024-05-21 | End: 2024-05-21 | Stop reason: HOSPADM

## 2024-05-21 RX ORDER — NALOXONE HYDROCHLORIDE 1 MG/ML
0.1 INJECTION INTRAMUSCULAR; INTRAVENOUS; SUBCUTANEOUS
Status: DISCONTINUED | OUTPATIENT
Start: 2024-05-21 | End: 2024-05-21 | Stop reason: HOSPADM

## 2024-05-21 RX ORDER — NALOXONE HYDROCHLORIDE 0.4 MG/ML
0.1 INJECTION, SOLUTION INTRAMUSCULAR; INTRAVENOUS; SUBCUTANEOUS
Status: DISCONTINUED | OUTPATIENT
Start: 2024-05-21 | End: 2024-05-21 | Stop reason: HOSPADM

## 2024-05-21 RX ORDER — IBUPROFEN 200 MG
600 TABLET ORAL ONCE
Status: DISCONTINUED | OUTPATIENT
Start: 2024-05-21 | End: 2024-05-21 | Stop reason: HOSPADM

## 2024-05-21 RX ORDER — PROPOFOL 10 MG/ML
INJECTION, EMULSION INTRAVENOUS PRN
Status: DISCONTINUED | OUTPATIENT
Start: 2024-05-21 | End: 2024-05-21

## 2024-05-21 RX ORDER — LIDOCAINE 40 MG/G
CREAM TOPICAL
Status: DISCONTINUED | OUTPATIENT
Start: 2024-05-21 | End: 2024-05-21 | Stop reason: HOSPADM

## 2024-05-21 RX ORDER — ACETAMINOPHEN 325 MG/1
975 TABLET ORAL ONCE
Status: DISCONTINUED | OUTPATIENT
Start: 2024-05-21 | End: 2024-05-21 | Stop reason: HOSPADM

## 2024-05-21 RX ORDER — FENTANYL CITRATE 50 UG/ML
50 INJECTION, SOLUTION INTRAMUSCULAR; INTRAVENOUS EVERY 5 MIN PRN
Status: DISCONTINUED | OUTPATIENT
Start: 2024-05-21 | End: 2024-05-21 | Stop reason: HOSPADM

## 2024-05-21 RX ORDER — LIDOCAINE HYDROCHLORIDE 10 MG/ML
INJECTION, SOLUTION INFILTRATION; PERINEURAL PRN
Status: DISCONTINUED | OUTPATIENT
Start: 2024-05-21 | End: 2024-05-21

## 2024-05-21 RX ORDER — DEXAMETHASONE SODIUM PHOSPHATE 4 MG/ML
4 INJECTION, SOLUTION INTRA-ARTICULAR; INTRALESIONAL; INTRAMUSCULAR; INTRAVENOUS; SOFT TISSUE
Status: DISCONTINUED | OUTPATIENT
Start: 2024-05-21 | End: 2024-05-21 | Stop reason: HOSPADM

## 2024-05-21 RX ORDER — BUPIVACAINE HYDROCHLORIDE 5 MG/ML
INJECTION, SOLUTION PERINEURAL PRN
Status: DISCONTINUED | OUTPATIENT
Start: 2024-05-21 | End: 2024-05-21 | Stop reason: HOSPADM

## 2024-05-21 RX ADMIN — PROPOFOL 170 MG: 10 INJECTION, EMULSION INTRAVENOUS at 15:07

## 2024-05-21 RX ADMIN — FENTANYL CITRATE 50 MCG: 50 INJECTION INTRAMUSCULAR; INTRAVENOUS at 15:34

## 2024-05-21 RX ADMIN — ONDANSETRON 4 MG: 2 INJECTION INTRAMUSCULAR; INTRAVENOUS at 16:04

## 2024-05-21 RX ADMIN — SODIUM CHLORIDE, POTASSIUM CHLORIDE, SODIUM LACTATE AND CALCIUM CHLORIDE: 600; 310; 30; 20 INJECTION, SOLUTION INTRAVENOUS at 14:54

## 2024-05-21 RX ADMIN — FENTANYL CITRATE 25 MCG: 50 INJECTION, SOLUTION INTRAMUSCULAR; INTRAVENOUS at 17:12

## 2024-05-21 RX ADMIN — OXYCODONE HYDROCHLORIDE 5 MG: 5 TABLET ORAL at 17:14

## 2024-05-21 RX ADMIN — FENTANYL CITRATE 25 MCG: 50 INJECTION, SOLUTION INTRAMUSCULAR; INTRAVENOUS at 16:47

## 2024-05-21 RX ADMIN — FENTANYL CITRATE 50 MCG: 50 INJECTION INTRAMUSCULAR; INTRAVENOUS at 15:07

## 2024-05-21 RX ADMIN — ROCURONIUM BROMIDE 40 MG: 50 INJECTION, SOLUTION INTRAVENOUS at 15:07

## 2024-05-21 RX ADMIN — SUGAMMADEX 200 MG: 100 INJECTION, SOLUTION INTRAVENOUS at 16:19

## 2024-05-21 RX ADMIN — DEXAMETHASONE SODIUM PHOSPHATE 4 MG: 10 INJECTION, SOLUTION INTRAMUSCULAR; INTRAVENOUS at 15:07

## 2024-05-21 RX ADMIN — LIDOCAINE HYDROCHLORIDE 4 ML: 10 INJECTION, SOLUTION INFILTRATION; PERINEURAL at 15:07

## 2024-05-21 RX ADMIN — PROPOFOL 50 MCG/KG/MIN: 10 INJECTION, EMULSION INTRAVENOUS at 15:20

## 2024-05-21 ASSESSMENT — ACTIVITIES OF DAILY LIVING (ADL)
ADLS_ACUITY_SCORE: 29

## 2024-05-21 NOTE — PROCEDURES
DATE OF SERVICE:    5/21/2024      SURGEON:    Brittni Jackson MD    ASSISTANT:   Gris Valdez PA-C     PREOPERATIVE DIAGNOSIS:   Complex right pelvic mass    POSTOPERATIVE DIAGNOSIS:   Same    PROCEDURE:  Robotic assisted bilateral salpingo-oophorectomy     ANESTHESIA:    General endotracheal     COMPLICATIONS:  None.     ESTIMATED BLOOD LOSS :   3 mL      FINDINGS:  Smooth peritoneal cavity.  Normal-appearing liver capsule.  Normal small intestine.  Normal large intestine.  Evidence of mesh along the anterior abdominal wall just posterior to the patient's umbilicus.  There was a small normal uterus in the pelvis.  The right ovary was cystically enlarged and had evidence of torsion on entry.  The left ovary was grossly normal.  The bilateral fallopian tubes were within normal limits.  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     PROCEDURE IN DETAIL:  Tessie Graham is a very pleasant 74-year-old female with a recurrent history of right lower quadrant pain of uncertain etiology.  She had a history of a bowel obstruction requiring resection in the past and subsequently underwent mesh placement in the ventral abdominal wall.  A pelvic ultrasound recently for recurrent symptoms identified the right ovary contained 2 cystic lesions which are mildly complex in appearance measuring 3.9 cm and 2.1 cm in the largest diameter.  A Ca1 25 tumor marker was 19 units/mL.  The patient strongly desired resection of the right ovarian cyst as there was concern that this was contributing to her recurrent pelvic pain.  The patient was counseled as to the standard of care recommended procedure.  The risks, benefits and alternatives were discussed in detail.  She was subsequently taken to the operating room for the above-stated procedure where general anesthesia was found to be adequate. She was prepared and draped in the normal sterile fashion in lithotomy positioning.  Her arms were padded and tucked at her sides.  A timeout and briefing were performed in the room and the staff were educated as to the planned procedure.    A left upper quadrant entry was planned. Bal's point was identified at the mid-clavicular line, just 1 cm belowthe costal margin. A 5 mm skin incision was created. An orogastric tube was in place and to low-intermittent suction. The 5 mm laparoscopic camera was introduced into the abdomen with a 5 mm optical viewing trocar. Following confirmation of entrance into the abdomen by visual inspection of the intraperitoneal space, the abdomen was insufflated to 15 mm Hg. The patient was placed in steep trendelenberg. The camera port site was mappedout 28 cm cephalad to the pubic symphysis.  The camera trocar site was placed directly through the middle of the mesh.  Additional port sites were mapped out 11 cm lateral with a 15 degree drop to each side of the camera port site. The right lower quadrant port site for assistant manipulation wasplaced 2 cm cephalad and medial to the ASIS. The left lower quadrant port site was mapped out an additional 11 cm lateral to the mid-left port site. The bowel was packed into the upper abdomen.  The robot was subsequently docked.    The right round ligament was grasped and held away from the sidewall. The monopolar prateek were used to dissect the peritoneum parallel and just lateral to the gonadal vessels. The para-rectal space was developed and the ureter was identified within the ureteral tunnel, and the gray space developed with a combination of blunt and electrosurgical development. The gonadal vessels were individually coagulated, sealed and transected. The vessels were then held medially, and the retroperitoneum developed anterior to the ureter. The anterior and posterior leaves of the broad ligament were taken to the utero-ovarian pedicle. Following this, bipolar cautery was used to coagulate, seal and transect the  utero-ovarian pedicle. The right ovary and fallopian tube were then delivered into a 8 cm endocatch bag and removed through the assistant port. The right adnexa was then sent for frozen section analysis. Attention was then turned to the left pelvic sidewall, where in a similar fashion, the peritoneum was opened, and the retroperitoneal space was developed. The ureter was found coursing in it's usual position at the bifurcation of the external and internal iliac vessels, and the gray space was opened with a combination of blunt and electrosurgical dissection. The gonadal vessels were held anteriorly and following, coagulated, sealed and transected. The posterior leaf was dissected, and the utero-ovarian pedicle was secondarily skeletonized. The pedicle was sealed, coagulated and transected. The left ovary and fallopian tube was then secondarily delivered through the right lower quadrant port site within the endocatch bag.     The pelvis was then irrigated and all surgical sites hemostatic.  Frozen section analysis returned as a benign cystadenoma of the right ovary.  The skin incisions were closed with a subcuticular stitch of 3-0 monocryl, and an overlying layer of demabond was placed. All incisions were then injected with 0.25% marcaine.     All sponges, needles and instrument counts were correct x2. She was taken to the recovery room in a stable condition.     Gris Valdez PA-C assisted me throughout the case and was paramount in the completion of all vital portions.  She assisted with retraction, adequate visualization, placement of the right lateral trocars, delivery of all specimens and closure.    Brittni Jackson MD  Gynecologic Oncologist  [facility] Office  [phone]

## 2024-05-21 NOTE — ANESTHESIA CARE TRANSFER NOTE
Patient: Tessie Graham    Procedure: Procedure(s):  ROBOTIC ASSISTED BILATERAL SALPINGO OOPHORECTOMY,       Diagnosis: Cyst of right ovary [N83.201]  Diagnosis Additional Information: No value filed.    Anesthesia Type:   General     Note:    Oropharynx: oropharynx clear of all foreign objects and spontaneously breathing  Level of Consciousness: drowsy  Oxygen Supplementation: face mask  Level of Supplemental Oxygen (L/min / FiO2): 6  Independent Airway: airway patency satisfactory and stable  Dentition: dentition unchanged  Vital Signs Stable: post-procedure vital signs reviewed and stable  Report to RN Given: handoff report given  Patient transferred to: PACU    Handoff Report: Identifed the Patient, Identified the Reponsible Provider, Reviewed the pertinent medical history, Discussed the surgical course, Reviewed Intra-OP anesthesia mangement and issues during anesthesia, Set expectations for post-procedure period and Allowed opportunity for questions and acknowledgement of understanding      Vitals:  Vitals Value Taken Time   /64 05/21/24 1635   Temp 37  C (98.6  F) 05/21/24 1635   Pulse 73 05/21/24 1641   Resp 17 05/21/24 1641   SpO2 98 % 05/21/24 1641   Vitals shown include unfiled device data.    Electronically Signed By: ALTAF Harry CRNA  May 21, 2024  4:43 PM

## 2024-05-21 NOTE — INTERVAL H&P NOTE
I have seen and examined the patient. There are no updates or changes to the preoperative history and physical.    Brittni Jackson MD  Gynecologic Oncology  [facility]  [facility]: [phone]

## 2024-05-21 NOTE — ANESTHESIA POSTPROCEDURE EVALUATION
Patient: Tessie Graham    Procedure: Procedure(s):  ROBOTIC ASSISTED BILATERAL SALPINGO OOPHORECTOMY,       Anesthesia Type:  General    Note:  Disposition: Outpatient   Postop Pain Control: Uneventful            Sign Out: Well controlled pain   PONV: No   Neuro/Psych: Uneventful            Sign Out: Acceptable/Baseline neuro status   Airway/Respiratory: Uneventful            Sign Out: Acceptable/Baseline resp. status   CV/Hemodynamics: Uneventful            Sign Out: Acceptable CV status   Other NRE:    DID A NON-ROUTINE EVENT OCCUR?            Last vitals:  Vitals Value Taken Time   /65 05/21/24 1700   Temp 37  C (98.6  F) 05/21/24 1635   Pulse 66 05/21/24 1701   Resp 11 05/21/24 1701   SpO2 99 % 05/21/24 1701   Vitals shown include unfiled device data.    Electronically Signed By: Dusty Kulkarni MD  May 21, 2024  5:02 PM

## 2024-05-21 NOTE — ANESTHESIA PREPROCEDURE EVALUATION
Anesthesia Pre-Procedure Evaluation    Patient: Tessie Graham   MRN: [id number] : 1950        Procedure : Procedure(s):  ROBOTIC ASSISTED BILATERAL SALPINGO OOPHORECTOMY,  POSSIBLE STAGING          Past Medical History:   Diagnosis Date    Acute bronchitis     Acute respiratory failure, unspecified whether with hypoxia or hypercapnia (H)     Anxiety     Bilateral pleural effusion     Bipolar disorder (H)     Bronchitis     Chronic constipation     Delayed emergence from anesthesia,Delayed emergence from anesthesia     blood pressure drops and hard time waking up    Diverticulosis     Gastric outlet obstruction     GERD (gastroesophageal reflux disease)     HTN (hypertension)     Hyperlipidemia     Hypopotassemia     Insomnia     Kidney stone     Liver lesion     Lyme disease     Mixed hyperlipidemia     Normocytic anemia     Osteoporosis     Ovarian cyst     Peptic ulcer     Pulmonary embolism (H)     Small bowel obstruction (H)     Tension headache     Thrombocytosis     Toe fracture, right       Past Surgical History:   Procedure Laterality Date    APPENDECTOMY      BOWEL RESECTION      partial small bwoel    CHOLECYSTECTOMY      COLONOSCOPY      EGD      robotic hernia repair with mesh      umbilical    SIGMOIDOSCOPY FLEXIBLE      TUBAL LIGATION      VENTRAL HERNIA REPAIR 2021        Allergies   Allergen Reactions    Azithromycin Shortness Of Breath and Other (See Comments)     HTN    Hydrochlorothiazide Itching    Trazodone Other (See Comments)     Tachycardia    Triamterene Itching      Social History     Tobacco Use    Smoking status: Never    Smokeless tobacco: Never   Substance Use Topics    Alcohol use: Yes     Comment: occas.      Wt Readings from Last 1 Encounters:   24 51.3 kg (113 lb)        Anesthesia Evaluation            ROS/MED HX  ENT/Pulmonary:  - neg pulmonary ROS     Neurologic:  - neg neurologic ROS     Cardiovascular:  - neg cardiovascular ROS   (+)  hypertension- -   -   "- -                                      METS/Exercise Tolerance: >4 METS    Hematologic:  - neg hematologic  ROS     Musculoskeletal:  - neg musculoskeletal ROS     GI/Hepatic:  - neg GI/hepatic ROS   (+) GERD,                   Renal/Genitourinary:  - neg Renal ROS   (+) renal disease,             Endo:  - neg endo ROS     Psychiatric/Substance Use:  - neg psychiatric ROS     Infectious Disease:  - neg infectious disease ROS     Malignancy:  - neg malignancy ROS     Other:  - neg other ROS          Physical Exam    Airway        Mallampati: II    Neck ROM: full     Respiratory Devices and Support         Dental           Cardiovascular   cardiovascular exam normal          Pulmonary   pulmonary exam normal                OUTSIDE LABS:  CBC: No results found for: \"WBC\", \"HGB\", \"HCT\", \"PLT\"  BMP: No results found for: \"NA\", \"POTASSIUM\", \"CHLORIDE\", \"CO2\", \"BUN\", \"CR\", \"GLC\"  COAGS: No results found for: \"PTT\", \"INR\", \"FIBR\"  POC: No results found for: \"BGM\", \"HCG\", \"HCGS\"  HEPATIC: No results found for: \"ALBUMIN\", \"PROTTOTAL\", \"ALT\", \"AST\", \"GGT\", \"ALKPHOS\", \"BILITOTAL\", \"BILIDIRECT\", \"DORINA\"  OTHER: No results found for: \"PH\", \"LACT\", \"A1C\", \"MARSHA\", \"PHOS\", \"MAG\", \"LIPASE\", \"AMYLASE\", \"TSH\", \"T4\", \"T3\", \"CRP\", \"SED\"    Anesthesia Plan    ASA Status:  3    NPO Status:  NPO Appropriate    Anesthesia Type: General.     - Airway: ETT   Induction: Intravenous.           Consents    Anesthesia Plan(s) and associated risks, benefits, and realistic alternatives discussed. Questions answered and patient/representative(s) expressed understanding.     - Discussed:     - Discussed with:  Patient            Postoperative Care       PONV prophylaxis: Ondansetron (or other 5HT-3), Dexamethasone or Solumedrol     Comments:               Gary Conway MD    I have reviewed the pertinent notes and labs in the chart from the past 30 days and (re)examined the patient.  Any updates or changes from those notes are reflected in this " note.             # Drug Induced Platelet Defect: home medication list includes an antiplatelet medication

## 2024-05-21 NOTE — ANESTHESIA PROCEDURE NOTES
Airway       Patient location during procedure: OR       Procedure Start/Stop Times: 5/21/2024 3:10 PM  Staff -        CRNA: Smitha Lester APRN CRNA       Performed By: CRNAIndications and Patient Condition       Indications for airway management: gurinder-procedural       Induction type:intravenous       Mask difficulty assessment: 1 - vent by mask    Final Airway Details       Final airway type: endotracheal airway       Successful airway: ETT - single  Endotracheal Airway Details        ETT size (mm): 7.0       Cuffed: yes       Successful intubation technique: direct laryngoscopy       DL Blade Type: MAC 3       Grade View of Cords: 1       Adjucts: stylet       Position: Right       Measured from: gums/teeth       Secured at (cm): 20       Bite block used: None    Post intubation assessment        Placement verified by: capnometry, equal breath sounds and chest rise        Number of attempts at approach: 1       Number of other approaches attempted: 0       Secured with: tape       Ease of procedure: easy       Dentition: Intact and Unchanged       Dental guard used and removed. Dental Guard Type: Standard White.    Medication(s) Administered   Medication Administration Time: 5/21/2024 3:10 PM

## 2024-05-23 LAB
PATH REPORT.COMMENTS IMP SPEC: NORMAL
PATH REPORT.COMMENTS IMP SPEC: NORMAL
PATH REPORT.FINAL DX SPEC: NORMAL
PATH REPORT.GROSS SPEC: NORMAL
PATH REPORT.INTRAOP OBS SPEC DOC: NORMAL
PATH REPORT.MICROSCOPIC SPEC OTHER STN: NORMAL
PATH REPORT.RELEVANT HX SPEC: NORMAL
PHOTO IMAGE: NORMAL

## 2024-05-23 PROCEDURE — 88307 TISSUE EXAM BY PATHOLOGIST: CPT | Mod: 26 | Performed by: PATHOLOGY

## 2024-05-23 PROCEDURE — 88331 PATH CONSLTJ SURG 1 BLK 1SPC: CPT | Mod: 26 | Performed by: PATHOLOGY

## 2024-05-23 PROCEDURE — 88305 TISSUE EXAM BY PATHOLOGIST: CPT | Mod: 26 | Performed by: PATHOLOGY

## 2024-05-23 PROCEDURE — 88311 DECALCIFY TISSUE: CPT | Mod: 26 | Performed by: PATHOLOGY

## 2024-06-12 ENCOUNTER — PATIENT OUTREACH (OUTPATIENT)
Dept: OBGYN | Facility: CLINIC | Age: 74
End: 2024-06-12
Payer: COMMERCIAL

## 2024-06-12 NOTE — TELEPHONE ENCOUNTER
"Panel Management Review      Health Maintenance List    Health Maintenance   Topic Date Due    DEXA  Never done    LIPID  Never done    ADVANCE CARE PLANNING  Never done    GLUCOSE  Never done    HEPATITIS C SCREENING  Never done    DTAP/TDAP/TD IMMUNIZATION (1 - Tdap) Never done    RSV VACCINE (Pregnancy & 60+) (1 - 1-dose 60+ series) Never done    FALL RISK ASSESSMENT  Never done    COLORECTAL CANCER SCREENING  01/29/2020    COVID-19 Vaccine (5 - 2023-24 season) 12/21/2023    PHQ-2 (once per calendar year)  Never done    MEDICARE ANNUAL WELLNESS VISIT  06/08/2024    INFLUENZA VACCINE  Completed    Pneumococcal Vaccine: 65+ Years  Completed    ZOSTER IMMUNIZATION  Completed    IPV IMMUNIZATION  Aged Out    HPV IMMUNIZATION  Aged Out    MENINGITIS IMMUNIZATION  Aged Out    RSV MONOCLONAL ANTIBODY  Aged Out       Composite cancer screening  Chart review shows that this patient is due/due soon for the following Colonoscopy  No results found for: \"PAP\"  Past Surgical History:   Procedure Laterality Date    APPENDECTOMY      BOWEL RESECTION      partial small bwoel    CHOLECYSTECTOMY      COLONOSCOPY      EGD      robotic hernia repair with mesh      umbilical    SALPINGO-OOPHORECTOMY, ROBOT-ASSISTED, USING DA KRANTHI XI Bilateral 5/21/2024    Procedure: ROBOTIC ASSISTED BILATERAL SALPINGO OOPHORECTOMY,;  Surgeon: Brittni Renee MD;  Location: [facility] [place] OR    SIGMOIDOSCOPY FLEXIBLE      TUBAL LIGATION      VENTRAL HERNIA REPAIR 07/23/2021         Is hysterectomy listed in surgical history? No   Is mastectomy listed in surgical history? No     Summary:    Patient is due/failing the following:   Colonoscopy    Action needed: Patient needs office visit for colonoscopy.    Type of outreach:  Sent [employer] message.      Staff Signature:  Arielle Cuellar CMA      "

## 2024-06-12 NOTE — LETTER
2024      Tessie Graham  [address] [facility]  [place] [address]              Dear Tessie,    To ensure we are providing the best quality care, we have reviewed your chart and see that you are due for:    Colon Cancer Screening:    If you have received a referral to schedule a Colonoscopy or choose to do a Colonoscopy instead of the FIT/ Cologuard test, please call [phone] to schedule.    If you have received a FIT test kit from a prior office visit, please check the expiration date. If , please get a new kit from the Lab/ Clinic. If not , please complete the test and mail in as soon as you are able.    If you would prefer to complete a Cologuard test, please reach out to your provider to see if this is an option for you.    You may call Dept: [phone] if you have any questions. If you have completed the tests outside of [facility], please have the results forwarded to our office Fax # [phone]. We will update the chart for your primary Physician to review before your next annual physical.        Sincerely,      Thao Lopes MD

## 2024-06-20 ENCOUNTER — TRANSFERRED RECORDS (OUTPATIENT)
Dept: HEALTH INFORMATION MANAGEMENT | Facility: CLINIC | Age: 74
End: 2024-06-20
Payer: COMMERCIAL

## 2024-07-28 ENCOUNTER — HEALTH MAINTENANCE LETTER (OUTPATIENT)
Age: 74
End: 2024-07-28

## 2025-04-02 ENCOUNTER — PATIENT OUTREACH (OUTPATIENT)
Dept: OBGYN | Facility: CLINIC | Age: 75
End: 2025-04-02
Payer: COMMERCIAL

## 2025-04-02 NOTE — TELEPHONE ENCOUNTER
"Panel Management Review        Health Maintenance List    Health Maintenance   Topic Date Due    LIPID  Never done    ADVANCE CARE PLANNING  Never done    DIABETES SCREENING  Never done    DTAP/TDAP/TD IMMUNIZATION (1 - Tdap) Never done    FALL RISK ASSESSMENT  Never done    COLORECTAL CANCER SCREENING  01/29/2020    RSV VACCINE (1 - 1-dose 75+ series) Never done    MEDICARE ANNUAL WELLNESS VISIT  06/08/2024    INFLUENZA VACCINE (1) 09/01/2024    COVID-19 Vaccine (5 - 2024-25 season) 09/01/2024    PHQ-2 (once per calendar year)  Never done    DEXA  08/17/2038    HEPATITIS C SCREENING  Completed    Pneumococcal Vaccine: 50+ Years  Completed    ZOSTER IMMUNIZATION  Completed    HPV IMMUNIZATION  Aged Out    MENINGITIS IMMUNIZATION  Aged Out       Composite cancer screening  Chart review shows that this patient is due/due soon for the following Colonoscopy  No results found for: \"PAP\"  Past Surgical History:   Procedure Laterality Date    APPENDECTOMY      BOWEL RESECTION      partial small bwoel    CHOLECYSTECTOMY      COLONOSCOPY      EGD      robotic hernia repair with mesh      umbilical    SALPINGO-OOPHORECTOMY, ROBOT-ASSISTED, USING DA KRANTHI XI Bilateral 5/21/2024    Procedure: ROBOTIC ASSISTED BILATERAL SALPINGO OOPHORECTOMY,;  Surgeon: Brittni Renee MD;  Location: [facility] OR    SIGMOIDOSCOPY FLEXIBLE      TUBAL LIGATION      VENTRAL HERNIA REPAIR 07/23/2021         Is hysterectomy listed in surgical history? No   Is mastectomy listed in surgical history? No     Summary:    Patient is due/failing the following:   Colonoscopy    Action needed: Patient needs office visit for colonoscopy.    Type of outreach:  Sent [employer] message.      Staff Signature:  Arielle Cuellar CMA      "

## 2025-04-02 NOTE — LETTER
2025      Tessie Graham  [address] [place] [address]              Dear Tessie,    To ensure we are providing the best quality care, we have reviewed your chart and see that you are due for:    Colon Cancer Screening:    If you have received a referral to schedule a Colonoscopy or choose to do a Colonoscopy instead of the FIT/ Cologuard test, please call [phone] to schedule.    If you have received a FIT test kit from a prior office visit, please check the expiration date. If , please get a new kit from the Lab/ Clinic. If not , please complete the test and mail in as soon as you are able.    If you would prefer to complete a Cologuard test, please reach out to your provider to see if this is an option for you.    You may call Dept: [phone] if you have any questions. If you have completed the tests outside of [facility], please have the results forwarded to our office Fax # [phone]. We will update the chart for your primary Physician to review before your next annual physical.        Sincerely,      Thao Lopes MD

## 2025-08-10 ENCOUNTER — HEALTH MAINTENANCE LETTER (OUTPATIENT)
Age: 75
End: 2025-08-10

## (undated) DEVICE — MAT FLOOR SURGICAL 40X38 0702140238

## (undated) DEVICE — DAVINCI XI SEAL UNIVERSAL 5-12MM 470500

## (undated) DEVICE — SUCTION STRYKERFLOW II 250-070-500

## (undated) DEVICE — POUCH TISSUE RETRIEVAL ROBOTIC 8MM 5.1" INTRO TRS-ROBO-8

## (undated) DEVICE — ANTIFOG SOLUTION SEE SHARP 150M TROCAR SWABS 30978

## (undated) DEVICE — SUTURE MONOCRYL 3-0 18 PS2 UND MCP497G

## (undated) DEVICE — PACK TRENGUARD 450 PROCEDURAL 2065406

## (undated) DEVICE — DRAPE LEGGINGS 8421

## (undated) DEVICE — SYR 50ML SLIP TIP W/O NDL 309654

## (undated) DEVICE — CUSTOM PACK DA VINCI GYN SMA5BDVHEA

## (undated) DEVICE — SOL NACL 0.9% INJ 1000ML BAG 2B1324X

## (undated) DEVICE — DRAPE SHEET TABLE COVER KC 42301*

## (undated) DEVICE — SUTURE VICRYL+ 0 27IN CT-1 UND VCP260H

## (undated) DEVICE — DRAPE POUCH INSTRUMENT 3 POCKET 1018L

## (undated) DEVICE — SUCTION MANIFOLD NEPTUNE 2 SYS 1 PORT 702-025-000

## (undated) DEVICE — DAVINCI HOT SHEARS TIP COVER  400180

## (undated) DEVICE — PROTECTOR ARM STANDARD ONE STEP

## (undated) DEVICE — BLADE KNIFE SURG 11 371111

## (undated) DEVICE — DRAPE U SPLIT 74X120" 29440

## (undated) DEVICE — GLOVE BIOGEL PI ULTRATOUCH G SZ 7.0 42170

## (undated) DEVICE — PREP DYNA-HEX 4% CHG SCRUB 4OZ BOTTLE MDS098710

## (undated) DEVICE — ENDO OBTURATOR ACCESS PORT BLADELESS 8X100MM IAS8-100LP

## (undated) DEVICE — GLOVE BIOGEL PI ULTRATOUCH G SZ 6.5 42165

## (undated) DEVICE — PREP CHLORAPREP 26ML TINTED HI-LITE ORANGE 930815

## (undated) DEVICE — TUBING FILTER TRI-LUMEN AIRSEAL ASC-EVAC1

## (undated) DEVICE — DAVINCI XI DRAPE COLUMN 470341

## (undated) DEVICE — BRIEF STRETCH XL MPS40

## (undated) DEVICE — DECANTER VIAL 2006S

## (undated) DEVICE — DRAPE IOBAN INCISE 23X17" 6650EZ

## (undated) DEVICE — SOL WATER IRRIG 1000ML BOTTLE 2F7114

## (undated) DEVICE — PITCHER STERILE 1000ML  SSK9004A

## (undated) DEVICE — LUBRICANT INST ELECTROLUBE EL101

## (undated) DEVICE — GLOVE UNDER INDICATOR PI SZ 7.0 LF 41670

## (undated) DEVICE — CATH FOLEY 16FR 5ML LUBRICATH LATEX 0165L16

## (undated) DEVICE — DRAPE LAP/CHOLE W/O POUCH 89225

## (undated) DEVICE — NEEDLE HYPO MAGELLAN SAFETY 22GA 1 1/2IN 8881850215

## (undated) DEVICE — SU DERMABOND ADVANCED .7ML DNX12

## (undated) DEVICE — DAVINCI XI DRAPE ARM 470015

## (undated) DEVICE — DAVINCI XI OBTURATOR BLADELESS 8MM 470359

## (undated) DEVICE — GOWN IMPERVIOUS BREATHABLE SMART LG 89015

## (undated) DEVICE — PAD POS XL 1X20X40IN PINK PIGAZZI

## (undated) RX ORDER — KETOROLAC TROMETHAMINE 30 MG/ML
INJECTION, SOLUTION INTRAMUSCULAR; INTRAVENOUS
Status: DISPENSED
Start: 2024-05-21

## (undated) RX ORDER — INDOCYANINE GREEN AND WATER 25 MG
KIT INJECTION
Status: DISPENSED
Start: 2024-05-21

## (undated) RX ORDER — ONDANSETRON 2 MG/ML
INJECTION INTRAMUSCULAR; INTRAVENOUS
Status: DISPENSED
Start: 2024-05-21

## (undated) RX ORDER — FENTANYL CITRATE 50 UG/ML
INJECTION, SOLUTION INTRAMUSCULAR; INTRAVENOUS
Status: DISPENSED
Start: 2024-05-21

## (undated) RX ORDER — BUPIVACAINE HYDROCHLORIDE 5 MG/ML
INJECTION, SOLUTION EPIDURAL; INTRACAUDAL
Status: DISPENSED
Start: 2024-05-21

## (undated) RX ORDER — DEXAMETHASONE SODIUM PHOSPHATE 10 MG/ML
INJECTION, SOLUTION INTRAMUSCULAR; INTRAVENOUS
Status: DISPENSED
Start: 2024-05-21

## (undated) RX ORDER — WATER 10 ML/10ML
INJECTION INTRAMUSCULAR; INTRAVENOUS; SUBCUTANEOUS
Status: DISPENSED
Start: 2024-05-21